# Patient Record
Sex: FEMALE | Race: BLACK OR AFRICAN AMERICAN | NOT HISPANIC OR LATINO | Employment: UNEMPLOYED | URBAN - METROPOLITAN AREA
[De-identification: names, ages, dates, MRNs, and addresses within clinical notes are randomized per-mention and may not be internally consistent; named-entity substitution may affect disease eponyms.]

---

## 2020-06-04 ENCOUNTER — OFFICE VISIT (OUTPATIENT)
Dept: FAMILY MEDICINE | Facility: CLINIC | Age: 35
End: 2020-06-04
Payer: MEDICAID

## 2020-06-04 VITALS
BODY MASS INDEX: 30.56 KG/M2 | SYSTOLIC BLOOD PRESSURE: 126 MMHG | WEIGHT: 179 LBS | TEMPERATURE: 99 F | DIASTOLIC BLOOD PRESSURE: 70 MMHG | HEIGHT: 64 IN | HEART RATE: 99 BPM | OXYGEN SATURATION: 97 %

## 2020-06-04 DIAGNOSIS — M54.42 CHRONIC BILATERAL LOW BACK PAIN WITH BILATERAL SCIATICA: ICD-10-CM

## 2020-06-04 DIAGNOSIS — B37.2 CANDIDIASIS, INTERTRIGINOUS: ICD-10-CM

## 2020-06-04 DIAGNOSIS — Z79.4 TYPE 2 DIABETES MELLITUS WITHOUT COMPLICATION, WITH LONG-TERM CURRENT USE OF INSULIN: ICD-10-CM

## 2020-06-04 DIAGNOSIS — M54.41 CHRONIC BILATERAL LOW BACK PAIN WITH BILATERAL SCIATICA: ICD-10-CM

## 2020-06-04 DIAGNOSIS — K21.9 GASTROESOPHAGEAL REFLUX DISEASE, ESOPHAGITIS PRESENCE NOT SPECIFIED: ICD-10-CM

## 2020-06-04 DIAGNOSIS — Z76.89 ENCOUNTER TO ESTABLISH CARE: Primary | ICD-10-CM

## 2020-06-04 DIAGNOSIS — R53.83 FATIGUE, UNSPECIFIED TYPE: ICD-10-CM

## 2020-06-04 DIAGNOSIS — Z29.9 ENCOUNTER FOR PREVENTIVE MEASURE: ICD-10-CM

## 2020-06-04 DIAGNOSIS — E11.9 TYPE 2 DIABETES MELLITUS WITHOUT COMPLICATION, WITH LONG-TERM CURRENT USE OF INSULIN: ICD-10-CM

## 2020-06-04 DIAGNOSIS — G89.29 CHRONIC BILATERAL LOW BACK PAIN WITH BILATERAL SCIATICA: ICD-10-CM

## 2020-06-04 DIAGNOSIS — E78.5 HYPERLIPIDEMIA, UNSPECIFIED HYPERLIPIDEMIA TYPE: ICD-10-CM

## 2020-06-04 PROCEDURE — 99205 PR OFFICE/OUTPT VISIT, NEW, LEVL V, 60-74 MIN: ICD-10-PCS | Mod: S$GLB,,, | Performed by: NURSE PRACTITIONER

## 2020-06-04 PROCEDURE — 99205 OFFICE O/P NEW HI 60 MIN: CPT | Mod: S$GLB,,, | Performed by: NURSE PRACTITIONER

## 2020-06-04 RX ORDER — INSULIN GLARGINE 100 [IU]/ML
30 INJECTION, SOLUTION SUBCUTANEOUS DAILY
COMMUNITY
End: 2023-11-30

## 2020-06-04 RX ORDER — GABAPENTIN 300 MG/1
300 CAPSULE ORAL 3 TIMES DAILY
Qty: 90 CAPSULE | Refills: 2 | Status: SHIPPED | OUTPATIENT
Start: 2020-06-04 | End: 2023-11-30

## 2020-06-04 RX ORDER — FAMOTIDINE 40 MG/1
40 TABLET, FILM COATED ORAL DAILY
Qty: 30 TABLET | Refills: 11 | Status: SHIPPED | OUTPATIENT
Start: 2020-06-04 | End: 2023-11-30

## 2020-06-04 RX ORDER — NYSTATIN 100000 [USP'U]/G
POWDER TOPICAL 2 TIMES DAILY
Qty: 60 G | Refills: 2 | Status: SHIPPED | OUTPATIENT
Start: 2020-06-04 | End: 2023-11-30

## 2020-06-04 RX ORDER — FLUCONAZOLE 150 MG/1
150 TABLET ORAL DAILY
Qty: 2 TABLET | Refills: 0 | Status: SHIPPED | OUTPATIENT
Start: 2020-06-04 | End: 2020-06-06
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
## 2020-06-08 ENCOUNTER — TELEPHONE (OUTPATIENT)
Dept: FAMILY MEDICINE | Facility: CLINIC | Age: 35
End: 2020-06-08

## 2020-06-08 NOTE — TELEPHONE ENCOUNTER
Pt states you gave her a name of an eye doctor. Do you recall that name?      Called the patient to advise the PA for her Bydureon was approved for 365 days. No answer, no voicemail set up. Faxed the approval to the [facility].

## 2020-06-08 NOTE — TELEPHONE ENCOUNTER
MRI of back denied. Pt would like to try back stretches/exercise and will contact insurance regarding chiropractor. Exercises printed for pt and left at  for her to complete. Complete 3-4 times/week x 6 weeks then schedule follow-up appt with me to re-evaluate.

## 2020-06-25 ENCOUNTER — TELEPHONE (OUTPATIENT)
Dept: FAMILY MEDICINE | Facility: CLINIC | Age: 35
End: 2020-06-25

## 2020-06-25 NOTE — TELEPHONE ENCOUNTER
Called pt w/o an answer. No voicemail.     ----- Message from Karen Ho sent at 6/25/2020  9:12 AM CDT -----  patient needs to know where she's to do blood work..[phone] (home)

## 2020-06-27 LAB
ABS NRBC COUNT: 0 X 10 3/UL (ref 0–0.01)
ABSOLUTE BASOPHIL: 0.03 X 10 3/UL (ref 0–0.22)
ABSOLUTE EOSINOPHIL: 0.05 X 10 3/UL (ref 0.04–0.54)
ABSOLUTE IMMATURE GRAN: 0.01 X 10 3/UL (ref 0–0.04)
ABSOLUTE LYMPHOCYTE: 2.13 X 10 3/UL (ref 0.86–4.75)
ABSOLUTE MONOCYTE: 0.39 X 10 3/UL (ref 0.22–1.08)
ALBUMIN SERPL-MCNC: 4 G/DL (ref 3.5–5.2)
ALBUMIN/GLOB SERPL ELPH: 1.2 {RATIO} (ref 1–2.7)
ALP ISOS SERPL LEV INH-CCNC: 80 U/L (ref 35–105)
ALT (SGPT): 12 U/L (ref 0–33)
AMM URATE CRY STONE QL IR: NORMAL
AMORPH URATE CRY URNS QL MICRO: NORMAL
ANION GAP SERPL CALC-SCNC: 10 MMOL/L (ref 8–17)
AST SERPL-CCNC: 13 U/L (ref 0–32)
BACTERIA #/AREA URNS HPF: NORMAL /[HPF]
BASOPHILS NFR BLD: 0.5 % (ref 0.2–1.2)
BILIRUB UR QL STRIP: NORMAL
BILIRUBIN, TOTAL: 0.3 MG/DL (ref 0–1.2)
BUN/CREAT SERPL: 18.3 (ref 6–20)
CALCIUM OXALATE: NORMAL
CALCIUM SERPL-MCNC: 9 MG/DL (ref 8.6–10.2)
CARBON DIOXIDE, CO2: 24 MMOL/L (ref 22–29)
CHLORIDE: 102 MMOL/L (ref 98–107)
CHOLEST CRY URNS QL MICRO: NORMAL
CHOLEST SERPL-MSCNC: 186 MG/DL (ref 100–200)
CLARITY UR: NORMAL
COLOR UR: NORMAL
CREAT SERPL-MCNC: 0.63 MG/DL (ref 0.5–0.9)
CREATININE RANDOM URINE: NORMAL
EOSINOPHIL NFR BLD: 0.9 % (ref 0.7–7)
EPITHELIAL CELLS: NORMAL
ESTIMATED AVERAGE GLUCOSE: 352 MG/DL
GFR ESTIMATION: 109.51
GLOBULIN: 3.3 G/DL (ref 1.5–4.5)
GLUCOSE (UA): NORMAL
GLUCOSE: 313 MG/DL (ref 74–106)
GRAN CASTS #/AREA URNS LPF: NORMAL /[LPF]
HBA1C MFR BLD: 13.9 % (ref 4–6)
HCT VFR BLD AUTO: 37.3 % (ref 37–47)
HDLC SERPL-MCNC: 41 MG/DL
HGB BLD-MCNC: 11.7 G/DL (ref 12–16)
HYALINE CASTS #/AREA URNS LPF: NORMAL /[LPF]
IMMATURE GRANULOCYTES: 0.2 % (ref 0–0.5)
KETONES UR QL STRIP: NORMAL
LDL/HDL RATIO: 3 (ref 1–3)
LDLC SERPL CALC-MCNC: 124 MG/DL (ref 0–100)
LEUKOCYTE ESTERASE UR QL STRIP: NORMAL
LYMPHOCYTES NFR BLD: 38.4 % (ref 19.3–53.1)
MCH RBC QN AUTO: 26.5 PG (ref 27–32)
MCHC RBC AUTO-ENTMCNC: 31.4 G/DL (ref 32–36)
MCV RBC AUTO: 84.4 FL (ref 82–100)
MICROALBUMIN QUANT: NORMAL
MICROALBUMIN/CREATININE RATIO: NORMAL
MONOCYTES NFR BLD: 7 % (ref 4.7–12.5)
MUCOUS THREADS URNS QL MICRO: NORMAL
NEUTROPHILS ABSOLUTE COUNT: 2.94 X 10 3/UL (ref 2.15–7.56)
NEUTROPHILS NFR BLD: 53 %
NITRITE UR QL STRIP: NORMAL
NUCLEATED RED BLOOD CELLS: 0 /100 WBC (ref 0–0.2)
OCCULT BLOOD: NORMAL
OTHER CASTS: NORMAL
OTHER CRYSTALS: NORMAL
PH, URINE: NORMAL
PLATELET # BLD AUTO: 267 X 10 3/UL (ref 135–400)
POTASSIUM: 4.2 MMOL/L (ref 3.5–5.1)
PROT SNV-MCNC: 7.3 G/DL (ref 6.4–8.3)
PROT UR QL STRIP: NORMAL
RBC # BLD AUTO: 4.42 X 10 6/UL (ref 4.2–5.4)
RBC CASTS #/AREA URNS LPF: NORMAL /[LPF]
RBC/HPF: NORMAL
RDW-SD: 40 FL (ref 37–54)
RENAL EPITHELIAL: NORMAL
SODIUM: 136 MMOL/L (ref 136–145)
SP GR UR STRIP: NORMAL
SPERMATAZOA: NORMAL
TRI-PHOS CRY URNS QL MICRO: NORMAL
TRICHOMONAS: NORMAL
TRIGL SERPL-MCNC: 105 MG/DL (ref 0–150)
TSH W/REFLEX TO FT4: 1.58 UIU/ML (ref 0.27–4.2)
URATE CRY URNS QL MICRO: NORMAL
UREA NITROGEN (BUN): 11.5 MG/DL (ref 6–20)
UROBILINOGEN, URINE: NORMAL
VITAMIN D (25OHD): 18.5 NG/ML
WBC # BLD: 5.55 X 10 3/UL (ref 4.3–10.8)
WBC CASTS #/AREA URNS LPF: NORMAL /[LPF]
WBC/HPF: NORMAL
YEAST URNS QL MICRO: NORMAL

## 2020-06-29 ENCOUNTER — OFFICE VISIT (OUTPATIENT)
Dept: FAMILY MEDICINE | Facility: CLINIC | Age: 35
End: 2020-06-29
Payer: MEDICAID

## 2020-06-29 VITALS
TEMPERATURE: 98 F | SYSTOLIC BLOOD PRESSURE: 112 MMHG | HEART RATE: 90 BPM | OXYGEN SATURATION: 98 % | WEIGHT: 177.88 LBS | HEIGHT: 64 IN | DIASTOLIC BLOOD PRESSURE: 78 MMHG | BODY MASS INDEX: 30.37 KG/M2

## 2020-06-29 DIAGNOSIS — F41.9 ANXIETY AND DEPRESSION: ICD-10-CM

## 2020-06-29 DIAGNOSIS — E11.9 TYPE 2 DIABETES MELLITUS WITHOUT COMPLICATION, WITH LONG-TERM CURRENT USE OF INSULIN: ICD-10-CM

## 2020-06-29 DIAGNOSIS — F32.A ANXIETY AND DEPRESSION: ICD-10-CM

## 2020-06-29 DIAGNOSIS — M54.42 CHRONIC BILATERAL LOW BACK PAIN WITH BILATERAL SCIATICA: ICD-10-CM

## 2020-06-29 DIAGNOSIS — E78.5 HYPERLIPIDEMIA, UNSPECIFIED HYPERLIPIDEMIA TYPE: Primary | ICD-10-CM

## 2020-06-29 DIAGNOSIS — M54.41 CHRONIC BILATERAL LOW BACK PAIN WITH BILATERAL SCIATICA: ICD-10-CM

## 2020-06-29 DIAGNOSIS — G89.29 CHRONIC BILATERAL LOW BACK PAIN WITH BILATERAL SCIATICA: ICD-10-CM

## 2020-06-29 DIAGNOSIS — G47.00 INSOMNIA, UNSPECIFIED TYPE: ICD-10-CM

## 2020-06-29 DIAGNOSIS — Z79.4 TYPE 2 DIABETES MELLITUS WITHOUT COMPLICATION, WITH LONG-TERM CURRENT USE OF INSULIN: ICD-10-CM

## 2020-06-29 PROCEDURE — 99213 OFFICE O/P EST LOW 20 MIN: CPT | Mod: S$GLB,,, | Performed by: NURSE PRACTITIONER

## 2020-06-29 PROCEDURE — 99213 PR OFFICE/OUTPT VISIT, EST, LEVL III, 20-29 MIN: ICD-10-PCS | Mod: S$GLB,,, | Performed by: NURSE PRACTITIONER

## 2020-06-29 RX ORDER — TRAZODONE HYDROCHLORIDE 50 MG/1
50 TABLET ORAL NIGHTLY
Qty: 30 TABLET | Refills: 0 | Status: SHIPPED | OUTPATIENT
Start: 2020-06-29 | End: 2020-07-29

## 2020-06-29 RX ORDER — ROSUVASTATIN CALCIUM 5 MG/1
5 TABLET, COATED ORAL NIGHTLY
Qty: 30 TABLET | Refills: 2 | Status: SHIPPED | OUTPATIENT
Start: 2020-06-29 | End: 2023-11-30

## 2020-06-29 RX ORDER — BUPROPION HYDROCHLORIDE 150 MG/1
150 TABLET ORAL DAILY
Qty: 30 TABLET | Refills: 2 | Status: SHIPPED | OUTPATIENT
Start: 2020-06-29 | End: 2020-09-27
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
## 2020-07-07 ENCOUNTER — TELEPHONE (OUTPATIENT)
Dept: FAMILY MEDICINE | Facility: CLINIC | Age: 35
End: 2020-07-07

## 2020-07-07 NOTE — TELEPHONE ENCOUNTER
Pharmacy faxed approval for PA on Jardiance. Pt called but there was no answer and no voicemail set up.

## 2020-07-13 LAB
LEFT EYE DM RETINOPATHY: NEGATIVE
RIGHT EYE DM RETINOPATHY: NEGATIVE

## 2021-04-08 ENCOUNTER — PATIENT OUTREACH (OUTPATIENT)
Dept: OBSTETRICS AND GYNECOLOGY | Facility: CLINIC | Age: 36
End: 2021-04-08

## 2021-04-09 ENCOUNTER — PATIENT OUTREACH (OUTPATIENT)
Dept: ADMINISTRATIVE | Facility: HOSPITAL | Age: 36
End: 2021-04-09

## 2021-04-26 ENCOUNTER — PATIENT OUTREACH (OUTPATIENT)
Dept: ADMINISTRATIVE | Facility: HOSPITAL | Age: 36
End: 2021-04-26

## 2023-11-30 ENCOUNTER — OFFICE VISIT (OUTPATIENT)
Dept: INTERNAL MEDICINE | Facility: CLINIC | Age: 38
End: 2023-11-30
Payer: MEDICAID

## 2023-11-30 VITALS
DIASTOLIC BLOOD PRESSURE: 88 MMHG | SYSTOLIC BLOOD PRESSURE: 138 MMHG | WEIGHT: 175 LBS | RESPIRATION RATE: 16 BRPM | HEIGHT: 64 IN | TEMPERATURE: 98 F | HEART RATE: 93 BPM | BODY MASS INDEX: 29.88 KG/M2

## 2023-11-30 DIAGNOSIS — R10.11 CHRONIC RUQ PAIN: ICD-10-CM

## 2023-11-30 DIAGNOSIS — E11.9 TYPE 2 DIABETES MELLITUS WITHOUT COMPLICATION, WITH LONG-TERM CURRENT USE OF INSULIN: ICD-10-CM

## 2023-11-30 DIAGNOSIS — Z11.3 SCREENING EXAMINATION FOR STD (SEXUALLY TRANSMITTED DISEASE): ICD-10-CM

## 2023-11-30 DIAGNOSIS — Z11.59 NEED FOR HEPATITIS C SCREENING TEST: ICD-10-CM

## 2023-11-30 DIAGNOSIS — Z00.00 WELLNESS EXAMINATION: Primary | ICD-10-CM

## 2023-11-30 DIAGNOSIS — Z79.4 TYPE 2 DIABETES MELLITUS WITHOUT COMPLICATION, WITH LONG-TERM CURRENT USE OF INSULIN: ICD-10-CM

## 2023-11-30 DIAGNOSIS — F17.210 CIGARETTE NICOTINE DEPENDENCE WITHOUT COMPLICATION: ICD-10-CM

## 2023-11-30 DIAGNOSIS — G89.29 CHRONIC RUQ PAIN: ICD-10-CM

## 2023-11-30 DIAGNOSIS — Z11.4 SCREENING FOR HIV (HUMAN IMMUNODEFICIENCY VIRUS): ICD-10-CM

## 2023-11-30 PROBLEM — B37.2 CANDIDIASIS, INTERTRIGINOUS: Status: RESOLVED | Noted: 2020-06-04 | Resolved: 2023-11-30

## 2023-11-30 PROCEDURE — 99385 PR PREVENTIVE VISIT,NEW,18-39: ICD-10-PCS | Mod: 25,S$PBB,, | Performed by: NURSE PRACTITIONER

## 2023-11-30 PROCEDURE — 99385 PREV VISIT NEW AGE 18-39: CPT | Mod: 25,S$PBB,, | Performed by: NURSE PRACTITIONER

## 2023-11-30 PROCEDURE — 99406 BEHAV CHNG SMOKING 3-10 MIN: CPT | Mod: S$PBB,,, | Performed by: NURSE PRACTITIONER

## 2023-11-30 PROCEDURE — 99406 PR TOBACCO USE CESSATION INTERMEDIATE 3-10 MINUTES: ICD-10-PCS | Mod: S$PBB,,, | Performed by: NURSE PRACTITIONER

## 2023-11-30 PROCEDURE — 99214 OFFICE O/P EST MOD 30 MIN: CPT | Mod: PBBFAC | Performed by: NURSE PRACTITIONER

## 2023-11-30 RX ORDER — INSULIN GLARGINE 100 [IU]/ML
30 INJECTION, SOLUTION SUBCUTANEOUS NIGHTLY
COMMUNITY
Start: 2023-06-12 | End: 2023-11-30

## 2023-11-30 RX ORDER — GABAPENTIN 100 MG/1
100 CAPSULE ORAL 3 TIMES DAILY
COMMUNITY
Start: 2023-06-12 | End: 2023-11-30

## 2023-11-30 NOTE — PROGRESS NOTES
LEIGHA Ta   [facility]  [address] [facility] [address]      PATIENT NAME: Anna Bell  : 1985  DATE: 23  MRN: [id number]      Patient PCP Information       Provider PCP Type    LEIGHA Ta General            Reason for Visit / Chief Complaint: Establish Care (Out of all meds time 1 month )       History of Present Illness / Problem Focused Workflow     Anna Bell presents to the clinic with Establish Care (Out of all meds time 1 month )     37 yo female here to establish care. Medical problems include T2DM, HLD, insomnia, and tobacco use.    Cervical Cancer Screening:  Osteoporosis Screenin2023 Vitamin D Level   Diabetic Screenin23 Foot / Fundal Exam   STD Screenin23  Wellness Screenin23  Pt here today to establish care, is agreeable to routine wellness labs for evaluation. Pt reports was previously followed by a provider in [place]; reports the current medications she reported she's only been taking for the last 1 month as needed. Pt instructed to hold any meds for now due to unknown status. We will f/u again in 2 weeks F2F for lab review and BP check. Pt does report with some diabetic neuropathy symptoms in her feet at times. We discussed need for diabetes control. Pt with hx of depression but reports she is not depressed anymore and would only takes those meds as needed as well. Pt with hx of chronic RUQ pain, [facility] notes available for review in chart with US results, will refer to GI for further eval of gallbladder. Denies any other issues today. Agreeable to Foot & Fundal exams.   Denies chest pain, shortness of breath, cough, fever, headache, dizziness, weakness, abdominal pain, nausea, vomiting, diarrhea, constipation, black/bloody stools, unplanned weight loss, night sweats, changes in urinary patterns, burning/odor with urination,  "depression, anxiety, and SI/HI.               Review of Systems     Review of Systems   Constitutional: Negative.    HENT: Negative.     Eyes: Negative.    Respiratory: Negative.     Cardiovascular: Negative.    Gastrointestinal: Negative.    Endocrine: Negative.    Genitourinary: Negative.    Neurological: Negative.    Psychiatric/Behavioral: Negative.           Medications and Allergies     Medications  Current Outpatient Medications   Medication Instructions    buPROPion (WELLBUTRIN XL) 150 mg, Oral, Daily    traZODone (DESYREL) 50 mg, Oral, Nightly         Allergies  Review of patient's allergies indicates:  No Known Allergies    Physical Examination     Visit Vitals  /88   Pulse 93   Temp 98.3 °F (36.8 °C)   Resp 16   Ht 5' 4" (1.626 m)   Wt 79.4 kg (175 lb)   LMP 11/20/2023   BMI 30.04 kg/m²       Physical Exam  Vitals reviewed.   Constitutional:       Appearance: Normal appearance. She is normal weight.   HENT:      Head: Normocephalic.   Cardiovascular:      Rate and Rhythm: Normal rate and regular rhythm.      Pulses: Normal pulses.      Heart sounds: Normal heart sounds.   Pulmonary:      Effort: Pulmonary effort is normal.      Breath sounds: Normal breath sounds.   Abdominal:      General: Abdomen is flat.      Palpations: Abdomen is soft.   Musculoskeletal:         General: Normal range of motion.      Cervical back: Normal range of motion.   Skin:     General: Skin is warm and dry.   Neurological:      Mental Status: She is alert.   Psychiatric:         Mood and Affect: Mood normal.           Results     US GALLBLADDER LIVER PANCREAS    Indication: RUQ pain    Comparison:  None    Findings:  The visualized portions of the hepatic parenchyma are homogeneous in echotexture with no mass lesion.  There is no evidence of gallbladder wall thickening, pericholecystic fluid, or cholelithiasis.  The common bile duct measures 8 mm in diameter. No appreciable choledocholithiasis or stricture. The visualized " pancreatic parenchyma is sonographically normal. No apparent pancreatic head mass. Images of the right kidney show no evidence of hydronephrosis or other acute renal pathology.    Impression: Borderline dilated common duct with no appreciable choledocholithiasis or biliary stricture. Correlate with hepatic enzymes to assess for biliary stasis pattern.      Assessment        ICD-10-CM ICD-9-CM   1. Wellness examination  Z00.00 V70.0   2. Chronic RUQ pain  R10.11 789.01    G89.29 338.29   3. Type 2 diabetes mellitus without complication, with long-term current use of insulin  E11.9 250.00    Z79.4 V58.67   4. Screening for HIV (human immunodeficiency virus)  Z11.4 V73.89   5. Screening examination for STD (sexually transmitted disease)  Z11.3 V74.5   6. Need for hepatitis C screening test  Z11.59 V73.89   7. Cigarette nicotine dependence without complication  F17.210 305.1        Plan      Problem List Items Addressed This Visit          Endocrine    Type 2 diabetes mellitus without complication, with long-term current use of insulin    Overview     Follow ADA diet.  Avoid soda, simple sweets, and limit rice/pasta/bread/starches and consume brown options when possible.   Maintain healthy weight with BMI goal <30.   Perform aerobic exercise for 150 minutes per week (or 5 days a week for 30 minutes each day).   Examine feet daily.            Current Assessment & Plan     A1C Today  Foot / Fundal Today            Relevant Orders    Hemoglobin A1C    MICROALBUMIN / CREATININE RATIO URINE    Diabetic Eye Screening Photo       GI    Chronic RUQ pain    Overview     06/2023 ABD US   Impression: Borderline dilated common duct with no appreciable choledocholithiasis or biliary stricture. Correlate with hepatic enzymes to assess for biliary stasis pattern.          Current Assessment & Plan     Referral to [facility] GI          Relevant Orders    Ambulatory referral/consult to Gastroenterology       Other    Wellness examination -  Primary    Current Assessment & Plan     Pt wellness visit completed today with appropriate lab work.            Relevant Orders    T4, Free    TSH    Vitamin D    Comprehensive Metabolic Panel    Urinalysis, Reflex to Urine Culture    Lipid Panel    CBC Auto Differential    HIV 1/2 Ag/Ab (4th Gen)    Cigarette nicotine dependence without complication    Current Assessment & Plan     Dangers of cigarette smoking were reviewed with patient in detail. Patient was Counseled for 3-10 minutes. Nicotine replacement options were discussed. Nicotine replacement was discussed- not prescribed per patient's request          Other Visit Diagnoses       Screening for HIV (human immunodeficiency virus)        Relevant Orders    HIV 1/2 Ag/Ab (4th Gen)    Screening examination for STD (sexually transmitted disease)        Relevant Orders    Chlamydia/GC, PCR    SYPHILIS ANTIBODY (WITH REFLEX RPR)    Need for hepatitis C screening test        Relevant Orders    Hepatitis C Antibody            Future Appointments   Date Time Provider Department Center   12/13/2023  9:00 AM [facility]        Follow up if symptoms worsen or fail to improve.      Signature:     [facility]  [address] [facility] [address]    Date of encounter: 11/30/23

## 2024-01-09 NOTE — ASSESSMENT & PLAN NOTE
Pt has h/o chronic anemia, likely due to anemia of chronic disease with baseline Hb appros 10-11  Pt developed ABLA in the setting of hematemesis and Hb dropped to 7.2  Pt with associated hypotension  Was transfused 1u PRBC on 1/8 with improvement in Hb to 9.2  GI evaluating UGI bleed:  EGD planned today  Cont to maggy   Pt wellness visit completed today with appropriate lab work.

## 2024-03-04 PROBLEM — Z00.00 WELLNESS EXAMINATION: Status: RESOLVED | Noted: 2023-11-30 | Resolved: 2024-03-04

## 2025-06-13 ENCOUNTER — HOSPITAL ENCOUNTER (EMERGENCY)
Facility: HOSPITAL | Age: 40
Discharge: HOME | End: 2025-06-13
Attending: FAMILY MEDICINE

## 2025-06-13 VITALS
OXYGEN SATURATION: 100 % | BODY MASS INDEX: 32.24 KG/M2 | RESPIRATION RATE: 16 BRPM | HEART RATE: 98 BPM | DIASTOLIC BLOOD PRESSURE: 80 MMHG | WEIGHT: 187.81 LBS | TEMPERATURE: 98 F | SYSTOLIC BLOOD PRESSURE: 169 MMHG

## 2025-06-13 DIAGNOSIS — E11.65 TYPE 2 DIABETES MELLITUS WITH HYPERGLYCEMIA, WITH LONG-TERM CURRENT USE OF INSULIN: Primary | ICD-10-CM

## 2025-06-13 DIAGNOSIS — Z79.4 TYPE 2 DIABETES MELLITUS WITH HYPERGLYCEMIA, WITH LONG-TERM CURRENT USE OF INSULIN: Primary | ICD-10-CM

## 2025-06-13 DIAGNOSIS — R73.9 HYPERGLYCEMIA: ICD-10-CM

## 2025-06-13 LAB
ABSOLUTE EOSINOPHIL (OHS): 0.03 K/UL
ABSOLUTE MONOCYTE (OHS): 0.36 K/UL (ref 0.3–1)
ABSOLUTE NEUTROPHIL COUNT (OHS): 6.13 K/UL (ref 1.8–7.7)
ALBUMIN SERPL BCP-MCNC: 3.3 G/DL (ref 3.5–5.2)
ALLENS TEST: ABNORMAL
ALP SERPL-CCNC: 95 UNIT/L (ref 40–150)
ALT SERPL W/O P-5'-P-CCNC: 31 UNIT/L (ref 10–44)
ANION GAP (OHS): 9 MMOL/L (ref 8–16)
AST SERPL-CCNC: 24 UNIT/L (ref 11–45)
B-HCG UR QL: NEGATIVE
B-OH-BUTYR BLD STRIP-SCNC: 0.1 MMOL/L
BACTERIA #/AREA URNS AUTO: NORMAL /HPF
BASOPHILS # BLD AUTO: 0.03 K/UL
BASOPHILS NFR BLD AUTO: 0.4 %
BILIRUB SERPL-MCNC: 0.1 MG/DL (ref 0.1–1)
BILIRUB UR QL STRIP.AUTO: NEGATIVE
BUN SERPL-MCNC: 16 MG/DL (ref 6–20)
CALCIUM SERPL-MCNC: 8.7 MG/DL (ref 8.7–10.5)
CHLORIDE SERPL-SCNC: 106 MMOL/L (ref 95–110)
CLARITY UR: CLEAR
CO2 SERPL-SCNC: 19 MMOL/L (ref 23–29)
COLOR UR AUTO: YELLOW
CREAT SERPL-MCNC: 0.8 MG/DL (ref 0.5–1.4)
ERYTHROCYTE [DISTWIDTH] IN BLOOD BY AUTOMATED COUNT: 14.3 % (ref 11.5–14.5)
GFR SERPLBLD CREATININE-BSD FMLA CKD-EPI: >60 ML/MIN/1.73/M2
GLUCOSE SERPL-MCNC: 214 MG/DL (ref 70–110)
GLUCOSE SERPL-MCNC: 338 MG/DL (ref 70–110)
GLUCOSE UR QL STRIP: ABNORMAL
HCO3 UR-SCNC: 21.8 MMOL/L (ref 24–28)
HCT VFR BLD AUTO: 32.8 % (ref 37–48.5)
HCT VFR BLD CALC: 32 %PCV (ref 36–54)
HGB BLD-MCNC: 10.5 GM/DL (ref 12–16)
HGB UR QL STRIP: ABNORMAL
HOLD SPECIMEN: NORMAL
HYALINE CASTS UR QL AUTO: 0 /LPF (ref 0–1)
IMM GRANULOCYTES # BLD AUTO: 0.02 K/UL (ref 0–0.04)
IMM GRANULOCYTES NFR BLD AUTO: 0.3 % (ref 0–0.5)
KETONES UR QL STRIP: NEGATIVE
LEUKOCYTE ESTERASE UR QL STRIP: NEGATIVE
LYMPHOCYTES # BLD AUTO: 1.13 K/UL (ref 1–4.8)
MAGNESIUM SERPL-MCNC: 1.9 MG/DL (ref 1.6–2.6)
MCH RBC QN AUTO: 26.4 PG (ref 27–31)
MCHC RBC AUTO-ENTMCNC: 32 G/DL (ref 32–36)
MCV RBC AUTO: 82 FL (ref 82–98)
MICROSCOPIC COMMENT: NORMAL
NITRITE UR QL STRIP: NEGATIVE
NUCLEATED RBC (/100WBC) (OHS): 0 /100 WBC
PCO2 BLDA: 40.5 MMHG (ref 35–45)
PH SMN: 7.34 [PH] (ref 7.35–7.45)
PH UR STRIP: 6 [PH]
PLATELET # BLD AUTO: 267 K/UL (ref 150–450)
PMV BLD AUTO: 10.2 FL (ref 9.2–12.9)
PO2 BLDA: 36 MMHG (ref 40–60)
POC BE: -4 MMOL/L (ref -2–2)
POC IONIZED CALCIUM: 1.23 MMOL/L (ref 1.06–1.42)
POC SATURATED O2: 66 % (ref 95–100)
POCT GLUCOSE: 345 MG/DL (ref 70–110)
POCT GLUCOSE: 349 MG/DL (ref 70–110)
POCT GLUCOSE: 372 MG/DL (ref 70–110)
POTASSIUM BLD-SCNC: 3.6 MMOL/L (ref 3.5–5.1)
POTASSIUM SERPL-SCNC: 3.9 MMOL/L (ref 3.5–5.1)
PROT SERPL-MCNC: 7.6 GM/DL (ref 6–8.4)
PROT UR QL STRIP: ABNORMAL
RBC # BLD AUTO: 3.98 M/UL (ref 4–5.4)
RBC #/AREA URNS AUTO: 2 /HPF (ref 0–4)
RELATIVE EOSINOPHIL (OHS): 0.4 %
RELATIVE LYMPHOCYTE (OHS): 14.7 % (ref 18–48)
RELATIVE MONOCYTE (OHS): 4.7 % (ref 4–15)
RELATIVE NEUTROPHIL (OHS): 79.5 % (ref 38–73)
SAMPLE: ABNORMAL
SITE: ABNORMAL
SODIUM BLD-SCNC: 142 MMOL/L (ref 136–145)
SODIUM SERPL-SCNC: 134 MMOL/L (ref 136–145)
SP GR UR STRIP: >=1.03
UROBILINOGEN UR STRIP-ACNC: NEGATIVE EU/DL
WBC # BLD AUTO: 7.7 K/UL (ref 3.9–12.7)
WBC #/AREA URNS AUTO: 1 /HPF (ref 0–5)
WBC CLUMPS UR QL AUTO: NORMAL
YEAST UR QL AUTO: NORMAL /HPF

## 2025-06-13 PROCEDURE — 82803 BLOOD GASES ANY COMBINATION: CPT

## 2025-06-13 PROCEDURE — 99900035 HC TECH TIME PER 15 MIN (STAT)

## 2025-06-13 PROCEDURE — 96374 THER/PROPH/DIAG INJ IV PUSH: CPT

## 2025-06-13 PROCEDURE — 83735 ASSAY OF MAGNESIUM: CPT | Performed by: FAMILY MEDICINE

## 2025-06-13 PROCEDURE — 93005 ELECTROCARDIOGRAM TRACING: CPT

## 2025-06-13 PROCEDURE — 82962 GLUCOSE BLOOD TEST: CPT

## 2025-06-13 PROCEDURE — 96372 THER/PROPH/DIAG INJ SC/IM: CPT | Performed by: FAMILY MEDICINE

## 2025-06-13 PROCEDURE — 99285 EMERGENCY DEPT VISIT HI MDM: CPT | Mod: 25

## 2025-06-13 PROCEDURE — 93010 ELECTROCARDIOGRAM REPORT: CPT | Mod: ,,, | Performed by: INTERNAL MEDICINE

## 2025-06-13 PROCEDURE — 63600175 PHARM REV CODE 636 W HCPCS: Performed by: FAMILY MEDICINE

## 2025-06-13 PROCEDURE — 82330 ASSAY OF CALCIUM: CPT

## 2025-06-13 PROCEDURE — 84295 ASSAY OF SERUM SODIUM: CPT

## 2025-06-13 PROCEDURE — 84132 ASSAY OF SERUM POTASSIUM: CPT

## 2025-06-13 PROCEDURE — 81025 URINE PREGNANCY TEST: CPT | Performed by: NURSE PRACTITIONER

## 2025-06-13 PROCEDURE — 25000003 PHARM REV CODE 250: Performed by: NURSE PRACTITIONER

## 2025-06-13 PROCEDURE — 96361 HYDRATE IV INFUSION ADD-ON: CPT

## 2025-06-13 PROCEDURE — 84520 ASSAY OF UREA NITROGEN: CPT | Performed by: NURSE PRACTITIONER

## 2025-06-13 PROCEDURE — 81001 URINALYSIS AUTO W/SCOPE: CPT | Performed by: NURSE PRACTITIONER

## 2025-06-13 PROCEDURE — 85014 HEMATOCRIT: CPT

## 2025-06-13 PROCEDURE — 82800 BLOOD PH: CPT

## 2025-06-13 PROCEDURE — 85025 COMPLETE CBC W/AUTO DIFF WBC: CPT | Performed by: NURSE PRACTITIONER

## 2025-06-13 PROCEDURE — 82010 KETONE BODYS QUAN: CPT | Performed by: NURSE PRACTITIONER

## 2025-06-13 RX ADMIN — HUMAN INSULIN 10 UNITS: 100 INJECTION, SOLUTION SUBCUTANEOUS at 11:06

## 2025-06-13 RX ADMIN — SODIUM CHLORIDE 1000 ML: 9 INJECTION, SOLUTION INTRAVENOUS at 11:06

## 2025-06-13 NOTE — DISCHARGE INSTRUCTIONS
Be sure to follow up with PCP and/or endocrinologist as soon as you get back to [place].    Return to ED or go to nearest ED for low blood sugar or symptomatic high blood sugar.

## 2025-06-13 NOTE — FIRST PROVIDER EVALUATION
Medical screening examination initiated.  I have conducted a focused provider triage encounter, findings are as follows:    Brief history of present illness:  reports bg 457 pta. Reports took insulin pta     There were no vitals filed for this visit.    Pertinent physical exam:  nad    Brief workup plan:  labs, meds, further eval     Preliminary workup initiated; this workup will be continued and followed by the physician or advanced practice provider that is assigned to the patient when roomed.

## 2025-06-13 NOTE — ED PROVIDER NOTES
SCRIBE #1 NOTE: I, Ronnie Prather and Nely Angela, am scribing for, and in the presence of, Micheal Crowley MD. I have scribed the entire note.       History     Chief Complaint   Patient presents with    Blood Sugar Problem     Pt. Reports nausea and feeling bad. She says that her CBG was 457 at home and she took 10 units Novalog.  in triage     Review of patient's allergies indicates:  No Known Allergies      History of Present Illness     HPI    2025, 11:24 AM  History obtained from the patient and medical records      History of Present Illness: Anna Bell is a 39 y.o. female patient with a PMHx of DM Type 2 and HLD who presents to the Emergency Department for evaluation of hyperglycemia which began this morning. Pt was driving to [place], [place] from [place] when she suddenly felt dizzy, anxious, SOB, and nauseous. Pt was diagnosed with DM in  after she had a miscarriage. Pt takes medication for her DM and is normally on 30 units twice a day, but adjusts daily accordingly to her sugar levels. Symptoms are constant and moderate in severity. No mitigating or exacerbating factors reported. Patient denies any vomiting, dysuria, fever, chills, or chest pain. No further complaints or concerns at this time.       Arrival mode: Personal Transportation    PCP: No primary care provider on file.        Past Medical History:  Past Medical History:   Diagnosis Date    Carpal tunnel syndrome, right     Diabetes mellitus, type 2     Hyperlipidemia     Neuropathic pain of lower extremity     Sciatica        Past Surgical History:  Past Surgical History:   Procedure Laterality Date     SECTION           Family History:  Family History   Problem Relation Name Age of Onset    Hypertension Mother      No Known Problems Father         Social History:  Social History     Tobacco Use    Smoking status: Some Days     Types: Cigars    Smokeless tobacco: Never    Tobacco comments:     2 to 3  times per year    Substance and Sexual Activity    Alcohol use: Yes    Drug use: Never    Sexual activity: Not on file        Review of Systems     Review of Systems   Constitutional:  Negative for chills and fever.   HENT:  Negative for sore throat.    Respiratory:  Positive for shortness of breath.    Cardiovascular:  Negative for chest pain.   Gastrointestinal:  Positive for nausea. Negative for vomiting.   Genitourinary:  Negative for dysuria.   Musculoskeletal:  Negative for back pain.   Skin:  Negative for rash.   Neurological:  Positive for dizziness. Negative for weakness.   Hematological:  Does not bruise/bleed easily.   Psychiatric/Behavioral:  The patient is nervous/anxious.       Physical Exam     Initial Vitals [06/13/25 0928]   BP Pulse Resp Temp SpO2   (!) 194/93 97 18 98.2 °F (36.8 °C) 100 %      MAP       --          Physical Exam  Nursing Notes and Vital Signs Reviewed.  Constitutional: Patient is in no acute distress. Well-developed and well-nourished.  Head: Atraumatic. Normocephalic.  Eyes: PERRL. EOM intact. Conjunctivae are not pale. No scleral icterus.  ENT: Mucous membranes are moist. Oropharynx is clear and symmetric.    Neck: Supple. Full ROM. No lymphadenopathy.  Cardiovascular: Regular rate. Regular rhythm. No murmurs, rubs, or gallops. Distal pulses are 2+ and symmetric.  Pulmonary/Chest: No respiratory distress. Clear to auscultation bilaterally. No wheezing or rales.  Abdominal: Soft and non-distended. There is no tenderness.  No rebound, guarding, or rigidity. Good bowel sounds.  Genitourinary: No CVA tenderness.  Musculoskeletal: Moves all extremities. No obvious deformities. No edema. No calf tenderness.  Skin: Warm and dry.  Neurological:  Alert, awake, and appropriate.  Normal speech.  No acute focal neurological deficits are appreciated.  Psychiatric: Normal affect. Good eye contact. Appropriate in content.     ED Course   Procedures  ED Vital Signs:  Vitals:    06/13/25 0928  06/13/25 1200 06/13/25 1230 06/13/25 1300   BP: (!) 194/93 (!) 189/91 (!) 162/73 (!) 169/80   Pulse: 97 (!) 115 93 98   Resp: 18 12 14 16   Temp: 98.2 °F (36.8 °C)   98 °F (36.7 °C)   TempSrc: Oral   Oral   SpO2: 100% 100% 100% 100%   Weight: 85.2 kg (187 lb 12.8 oz)          Abnormal Lab Results:  Labs Reviewed   COMPREHENSIVE METABOLIC PANEL - Abnormal       Result Value    Sodium 134 (*)     Potassium 3.9      Chloride 106      CO2 19 (*)     Glucose 338 (*)     BUN 16      Creatinine 0.8      Calcium 8.7      Protein Total 7.6      Albumin 3.3 (*)     Bilirubin Total 0.1      ALP 95      AST 24      ALT 31      Anion Gap 9      eGFR >60     URINALYSIS, REFLEX TO URINE CULTURE - Abnormal    Color, UA Yellow      Appearance, UA Clear      pH, UA 6.0      Spec Grav UA >=1.030 (*)     Protein, UA 2+ (*)     Glucose, UA 4+ (*)     Ketones, UA Negative      Bilirubin, UA Negative      Blood, UA 1+ (*)     Nitrites, UA Negative      Urobilinogen, UA Negative      Leukocyte Esterase, UA Negative     CBC WITH DIFFERENTIAL - Abnormal    WBC 7.70      RBC 3.98 (*)     HGB 10.5 (*)     HCT 32.8 (*)     MCV 82      MCH 26.4 (*)     MCHC 32.0      RDW 14.3      Platelet Count 267      MPV 10.2      Nucleated RBC 0      Neut % 79.5 (*)     Lymph % 14.7 (*)     Mono % 4.7      Eos % 0.4      Basophil % 0.4      Imm Grans % 0.3      Neut # 6.13      Lymph # 1.13      Mono # 0.36      Eos # 0.03      Baso # 0.03      Imm Grans # 0.02     POCT GLUCOSE - Abnormal    POCT Glucose 372 (*)    POCT GLUCOSE - Abnormal    POCT Glucose 349 (*)    ISTAT PROCEDURE - Abnormal    POC PH 7.338 (*)     POC PCO2 40.5      POC PO2 36 (*)     POC HCO3 21.8 (*)     POC BE -4 (*)     POC SATURATED O2 66      POC Glucose 214 (*)     POC Sodium 142      POC Potassium 3.6      POC Ionized Calcium 1.23      POC Hematocrit 32 (*)     Sample VENOUS      Site Other      Allens Test N/A     POCT GLUCOSE - Abnormal    POCT Glucose 345 (*)    BETA -  HYDROXYBUTYRATE, SERUM - Normal    Beta-Hydroxybutyrate 0.1     PREGNANCY TEST, URINE RAPID - Normal    hCG Qualitative, Urine Negative     MAGNESIUM - Normal    Magnesium  1.9     CBC W/ AUTO DIFFERENTIAL    Narrative:     The following orders were created for panel order CBC auto differential.  Procedure                               Abnormality         Status                     ---------                               -----------         ------                     CBC with Differential[[id number]]       Abnormal            Final result                 Please view results for these tests on the individual orders.   GREY TOP URINE HOLD    Extra Tube Hold for add-ons.     URINALYSIS MICROSCOPIC    RBC, UA 2      WBC, UA 1      WBC Clumps, UA None      Bacteria, UA None      Yeast, UA None      Hyaline Casts, UA 0      Microscopic Comment            All Lab Results:  Results for orders placed or performed during the hospital encounter of 06/13/25   POCT glucose    Collection Time: 06/13/25  9:31 AM   Result Value Ref Range    POCT Glucose 372 (H) 70 - 110 mg/dL   POCT glucose    Collection Time: 06/13/25 10:50 AM   Result Value Ref Range    POCT Glucose 345 (H) 70 - 110 mg/dL   POCT glucose    Collection Time: 06/13/25 11:20 AM   Result Value Ref Range    POCT Glucose 349 (H) 70 - 110 mg/dL   Comprehensive metabolic panel    Collection Time: 06/13/25 11:27 AM   Result Value Ref Range    Sodium 134 (L) 136 - 145 mmol/L    Potassium 3.9 3.5 - 5.1 mmol/L    Chloride 106 95 - 110 mmol/L    CO2 19 (L) 23 - 29 mmol/L    Glucose 338 (H) 70 - 110 mg/dL    BUN 16 6 - 20 mg/dL    Creatinine 0.8 0.5 - 1.4 mg/dL    Calcium 8.7 8.7 - 10.5 mg/dL    Protein Total 7.6 6.0 - 8.4 gm/dL    Albumin 3.3 (L) 3.5 - 5.2 g/dL    Bilirubin Total 0.1 0.1 - 1.0 mg/dL    ALP 95 40 - 150 unit/L    AST 24 11 - 45 unit/L    ALT 31 10 - 44 unit/L    Anion Gap 9 8 - 16 mmol/L    eGFR >60 >60 mL/min/1.73/m2   Beta - Hydroxybutyrate, Serum     Collection Time: 06/13/25 11:27 AM   Result Value Ref Range    Beta-Hydroxybutyrate 0.1 <=0.5 mmol/L   Urinalysis, Reflex to Urine Culture Urine, Clean Catch    Collection Time: 06/13/25 11:27 AM    Specimen: Urine   Result Value Ref Range    Color, UA Yellow Straw, Nona, Yellow, Light-Orange    Appearance, UA Clear Clear    pH, UA 6.0 5.0 - 8.0    Spec Grav UA >=1.030 (A) 1.005 - 1.030    Protein, UA 2+ (A) Negative    Glucose, UA 4+ (A) Negative    Ketones, UA Negative Negative    Bilirubin, UA Negative Negative    Blood, UA 1+ (A) Negative    Nitrites, UA Negative Negative    Urobilinogen, UA Negative <2.0 EU/dL    Leukocyte Esterase, UA Negative Negative   Pregnancy, urine rapid (UPT)    Collection Time: 06/13/25 11:27 AM   Result Value Ref Range    hCG Qualitative, Urine Negative Negative   CBC with Differential    Collection Time: 06/13/25 11:27 AM   Result Value Ref Range    WBC 7.70 3.90 - 12.70 K/uL    RBC 3.98 (L) 4.00 - 5.40 M/uL    HGB 10.5 (L) 12.0 - 16.0 gm/dL    HCT 32.8 (L) 37.0 - 48.5 %    MCV 82 82 - 98 fL    MCH 26.4 (L) 27.0 - 31.0 pg    MCHC 32.0 32.0 - 36.0 g/dL    RDW 14.3 11.5 - 14.5 %    Platelet Count 267 150 - 450 K/uL    MPV 10.2 9.2 - 12.9 fL    Nucleated RBC 0 <=0 /100 WBC    Neut % 79.5 (H) 38 - 73 %    Lymph % 14.7 (L) 18 - 48 %    Mono % 4.7 4 - 15 %    Eos % 0.4 <=8 %    Basophil % 0.4 <=1.9 %    Imm Grans % 0.3 0.0 - 0.5 %    Neut # 6.13 1.8 - 7.7 K/uL    Lymph # 1.13 1 - 4.8 K/uL    Mono # 0.36 0.3 - 1 K/uL    Eos # 0.03 <=0.5 K/uL    Baso # 0.03 <=0.2 K/uL    Imm Grans # 0.02 0.00 - 0.04 K/uL   Magnesium    Collection Time: 06/13/25 11:27 AM   Result Value Ref Range    Magnesium  1.9 1.6 - 2.6 mg/dL   GREY TOP URINE HOLD    Collection Time: 06/13/25 11:27 AM   Result Value Ref Range    Extra Tube Hold for add-ons.    Urinalysis Microscopic    Collection Time: 06/13/25 11:27 AM   Result Value Ref Range    RBC, UA 2 0 - 4 /HPF    WBC, UA 1 0 - 5 /HPF    WBC Clumps, UA None None,  Rare    Bacteria, UA None None, Rare, Occasional /HPF    Yeast, UA None None /HPF    Hyaline Casts, UA 0 0 - 1 /LPF    Microscopic Comment     EKG 12-lead    Collection Time: 06/13/25 11:34 AM   Result Value Ref Range    QRS Duration 78 ms    [facility] QTC Calculation 466 ms   ISTAT PROCEDURE    Collection Time: 06/13/25 12:06 PM   Result Value Ref Range    POC PH 7.338 (L) 7.35 - 7.45    POC PCO2 40.5 35 - 45 mmHg    POC PO2 36 (L) 40 - 60 mmHg    POC HCO3 21.8 (L) 24 - 28 mmol/L    POC BE -4 (L) -2 to 2 mmol/L    POC SATURATED O2 66 95 - 100 %    POC Glucose 214 (H) 70 - 110 mg/dL    POC Sodium 142 136 - 145 mmol/L    POC Potassium 3.6 3.5 - 5.1 mmol/L    POC Ionized Calcium 1.23 1.06 - 1.42 mmol/L    POC Hematocrit 32 (L) 36 - 54 %PCV    Sample VENOUS     Site Other     Allens Test N/A        Imaging Results:  Imaging Results              X-Ray Chest AP Portable (Final result)  Result time 06/13/25 09:48:01      Final result by Gurinder Gordon MD (06/13/25 09:48:01)                   Impression:      No acute process seen.      Electronically signed by: Gurinder Gordon MD  Date:    06/13/2025  Time:    09:48               Narrative:    EXAMINATION:  XR CHEST AP PORTABLE    CLINICAL HISTORY:  hyperglycemia;    FINDINGS:  Single view of the chest.  Comparison 10/06/2014    Cardiac silhouette is normal.  The lungs demonstrate no evidence of active disease.  No evidence of pleural effusion or pneumothorax.  Bones appear intact.                                       The EKG was ordered, reviewed, and independently interpreted by the ED provider.  Interpretation time: 11:34  Rate: 86 BPM  Rhythm: normal sinus rhythm  Interpretation: Nonspecific T wave abnormality. Prolonged QT. No STEMI.           The Emergency Provider reviewed the vital signs and test results, which are outlined above.     ED Discussion     1:15 PM: Reassessed pt at this time. Discussed with patient and/or family/caretaker all pertinent ED information and  results. Discussed pt dx and plan of tx. Gave the patient all f/u and return to the ED instructions. All questions and concerns were addressed at this time. Patient and/or family/caretaker expresses understanding of information and instructions, and is comfortable with plan to discharge. Pt is stable for discharge.     I discussed with patient and/or family/caretaker that evaluation in the ED does not suggest any emergent or life threatening medical conditions requiring immediate intervention beyond what was provided in the ED, and I believe patient is safe for discharge. Regardless, an unremarkable evaluation in the ED does not preclude the development or presence of a serious or life threatening condition. As such, I instructed that the patient is to return immediately for any worsening or change in current symptoms.         Medical Decision Making  Differential diagnoses: Vertigo, CVA, cerebellar stroke, infectious etiology, electrolyte abnormality, dehydration,  otitis media, Meniere's disease, vestibular neuritis, benign paroxysmal positional vertigo, brainstem ischemia    39-year-old black female with a history of diabetes.  Patient is currently on 70 30 insulin twice a day.  She is under that prescription.  Needs refill.  She is currently living in [place] but we will be moving to [place].  She was on her way back to [place] when she began to have some dizziness and lightheadedness.  Her glucose was elevated.  Not in DKA.    We gave her 10 units subQ insulin R, 10 units subQ insulin are.  Her glucose improved from 372  to 247.   Clinically she is feeling better.    Workup was unremarkable otherwise.    Patient was discharged in stable condition.  Recommend follow up with PCP as soon as possible.  Refill for insulin 70 30, 30 units twice daily or as directed.    Amount and/or Complexity of Data Reviewed  Labs: ordered. Decision-making details documented in ED Course.  Radiology: ordered. Decision-making  details documented in ED Course.  ECG/medicine tests: ordered and independent interpretation performed. Decision-making details documented in ED Course.    Risk  OTC drugs.                ED Medication(s):  Medications   sodium chloride 0.9% bolus 1,000 mL 1,000 mL (0 mLs Intravenous Stopped 6/13/25 1227)   insulin regular injection 10 Units 0.1 mL (10 Units Intravenous Given 6/13/25 1126)   insulin regular injection 10 Units 0.1 mL (10 Units Subcutaneous Given 6/13/25 1122)       Discharge Medication List as of 6/13/2025  1:12 PM        START taking these medications    Details   insulin NPH/Reg human (HUMULIN, 70/30,) 100 unit/mL (70-30) InPn pen Inject 30 Units into the skin 2 (two) times a day., Starting Fri 6/13/2025, Until Sat 6/13/2026, Print                     Scribe Attestation:   Scribe #1: I performed the above scribed service and the documentation accurately describes the services I performed. I attest to the accuracy of the note.     Attending:   Physician Attestation Statement for Scribe #1: I, Micheal Crowley MD, personally performed the services described in this documentation, as scribed by Ronnie Prather and Nely Angela, in my presence, and it is both accurate and complete.           Clinical Impression       ICD-10-CM ICD-9-CM   1. Type 2 diabetes mellitus with hyperglycemia, with long-term current use of insulin  E11.65 250.00    Z79.4 790.29     V58.67   2. Hyperglycemia  R73.9 790.29       Disposition:   Disposition: Discharged  Condition: Stable       Micheal Crowley MD  06/14/25 1049

## 2025-06-14 LAB
OHS QRS DURATION: 78 MS
OHS QTC CALCULATION: 466 MS